# Patient Record
(demographics unavailable — no encounter records)

---

## 2018-10-25 NOTE — EMERGENCY ROOM REPORT
History of Present Illness


General


Chief Complaint:  Alcohol Intoxication





Present Illness


HPI


26 YO Male presents to the ED for acute alcohol intoxication. By standers 

called 911 when they saw pt. altered in a parking lot of [employer] drinking wine. 

he was d/c earlier today after being evaluated for similar presentation. Pt. 

denies trauma or fall. HPI and ROS are limited at this time. pt. only 

verbalizes how much he needs to go the bathroom to urinate.


Allergies:  


Coded Allergies:  


     No Known Allergies (Unverified , 10/25/18)





Patient History


Past Medical History:  see triage record


Past Surgical History:  none


Pertinent Family History:  none


Reviewed Nursing Documentation:  PMH: Agreed; PSxH: Agreed





Review of Systems


All Other Systems:  limited





Physical Exam





Vital Signs








  Date Time  Temp Pulse Resp B/P (MAP) Pulse Ox O2 Delivery O2 Flow Rate FiO2


 


10/25/18 19:13 97.9 18 18 139/78 98 Room Air  








Sp02 EP Interpretation:  reviewed, normal


General Appearance:  no apparent distress, alert - alert and rambling, GCS 15, 

non-toxic


Head:  normocephalic, atraumatic - evidence of old abrasions on forehead and 

left side of face.


Eyes:  bilateral eye normal inspection, bilateral eye PERRL


ENT:  hearing grossly normal, normal voice


Neck:  full range of motion, no bony tend


Respiratory:  chest non-tender, lungs clear, normal breath sounds, speaking 

full sentences, other - no bruises or marks


Cardiovascular #1:  regular rate, rhythm


Gastrointestinal:  normal bowel sounds, non tender, soft, other - no abdominal 

bruising


Musculoskeletal:  back normal, normal range of motion, non-tender, other - pt. 

has staggered gait.


Neurologic:  alert, responsive, motor strength/tone normal, sensory intact, 

other - slurred words, grossly normal


Psychiatric:  judgement/insight normal


Skin:  normal color, no rash, warm/dry, well hydrated, other - abrasions of 

multiple sites, track marks on the bilateral a/c's ,





Medical Decision Making


PA Attestation


 Dr. Doe is my supervising Physician whom patient management has been 

discussed with.


Diagnostic Impression:  


 Primary Impression:  


 Acute alcoholic intoxication


 Qualified Codes:  F10.929 - Alcohol use, unspecified with intoxication, 

unspecified


ER Course


Pt. presents to the ED intoxicated with alcohol, pt. is NAD, pt. is alert, no 

obvious signs of trauma, able to ambulate to chair. 





Ddx considered but are not limited to ETOH, Trauma, Syncope, dementia, OD 





Vital signs: are WNL, pt. is afebrile 





H&PE are most consistent with ETOH intoxication, pt. NAD, non-toxic in 

appearance,clinically inebriated 


ORDERS: CT Head: Unremarkable





ED INTERVENTIONS: Observance while he detoxifies. Pt. was allowed to sleep/rest.





DISCHARGE: At this time pt. is stable for d/c to home. Will provide printed 

patient care instructions, and any necessary prescriptions. Care plan and 

follow up instructions have been discussed with the patient prior to discharge.


CT/MRI/US Diagnostic Results


CT/MRI/US Diagnostic Results :  


   Imaging Test Ordered:  CT Head No Contrast


   Impression


No evidence of acute fracture, hemorrhage, or intracranial process  Per: 

official radiology report





Last Vital Signs








  Date Time  Temp Pulse Resp B/P (MAP) Pulse Ox O2 Delivery O2 Flow Rate FiO2


 


10/25/18 19:13 97.9 18 18 139/78 98 Room Air  








Signed Out To:  Dr. Rey


Referrals:  


NOT CHOSEN [facility]/MD,REFERRING (PCP)











Nehal Beth Oct 25, 2018 21:02

## 2018-10-25 NOTE — EMERGENCY ROOM REPORT
History of Present Illness


General


Chief Complaint:  Alcohol Intoxication


Source:  Patient, EMS





Present Illness


HPI


Patient brought by EMS.  Allegedly he's been drinking alcohol.  He admits to 

this.  He states that he's "fine".  He has some scrapes on his face that it 

happened several days ago. He states he's had seizures in the past but denies 

seizures recently.  Aside from that he refuses to answer questions at this time.


Allergies:  


Coded Allergies:  


     No Known Allergies (Unverified , 10/25/18)





Patient History


Limited by:  medical condition - Acute intoxication


Past Medical History:  see triage record


Social History:  Reports: alcohol use


Reviewed Nursing Documentation:  PMH: Agreed; PSxH: Agreed





Nursing Documentation-PMH


Past Medical History:  No History, Except For


History Of Psychiatric Problem:  No - alcohol abuse





Review of Systems


All Other Systems:  limited





Physical Exam





Vital Signs








  Date Time  Temp Pulse Resp B/P (MAP) Pulse Ox O2 Delivery O2 Flow Rate FiO2


 


10/25/18 07:38 97.5 84 16 143/92 98 Room Air  








General Appearance:  lethargic, other - GCS 14


Head:  normocephalic, other - Facial abrasions


Eyes:  bilateral eye other - Nystagmus


ENT:  moist mucus membranes, other - apthous ulcer lower lip


Neck:  supple, no bony tend


Respiratory:  lungs clear, normal breath sounds


Cardiovascular #1:  normal inspection, regular rate, rhythm


Cardiovascular #2:  2+ radial (R)


Gastrointestinal:  non tender, soft, decreased bowel sounds, scaphoid


Genitourinary:  no CVA tenderness


Neurologic:  responsive, DTRs symmetric, sensory intact, other - And ataxic, 

nystagmus, s


Psychiatric:  other - Lethargic


Skin:  abrasions -





Medical Decision Making


Diagnostic Impression:  


 Primary Impression:  


 Acute alcoholic intoxication


 Qualified Codes:  F10.929 - Alcohol use, unspecified with intoxication, 

unspecified


 Additional Impression:  


 Aphthous ulcer


ER Course


Patient presents with alleged alcohol intoxication.  Differential includes 

alcohol ingestion, other drug ingestion, left right abnormality amongst others.

  The facial trauma is old.  Aside from nystagmus and ataxia the patient has a 

nonfocal neurologic exam.  CT is not indicated at this time.  The patient will 

be evaluated with labs.  The patient be treated with IV hydration and a dose of 

thiamine IV.





Labs with elevated BAL.  Tox + for benzos.  Slightly elevated CK.





After observation and hydration, patient ambulatory.





Denies SI.  States problem with alcohol and had sponsor.  Poor social situation 

at the moment, but knows how to find a place to stay.


Denies pain, NVD, HA, dizziness, back or extremity pain, dysuria, cough, 

dyspnea.





Patient stable for outpatient observation and treatment.





Laboratory Tests








Test


  10/25/18


08:00


 


White Blood Count


  8.8 K/UL


(4.8-10.8)


 


Red Blood Count


  4.96 M/UL


(4.70-6.10)


 


Hemoglobin


  15.5 G/DL


(14.2-18.0)


 


Hematocrit


  45.6 %


(42.0-52.0)


 


Mean Corpuscular Volume 92 FL (80-99)  


 


Mean Corpuscular Hemoglobin


  31.2 PG


(27.0-31.0)  H


 


Mean Corpuscular Hemoglobin


Concent 33.9 G/DL


(32.0-36.0)


 


Red Cell Distribution Width


  12.7 %


(11.6-14.8)


 


Platelet Count


  320 K/UL


(150-450)


 


Mean Platelet Volume


  6.0 FL


(6.5-10.1)  L


 


Neutrophils (%) (Auto)


  56.1 %


(45.0-75.0)


 


Lymphocytes (%) (Auto)


  34.8 %


(20.0-45.0)


 


Monocytes (%) (Auto)


  6.6 %


(1.0-10.0)


 


Eosinophils (%) (Auto)


  1.3 %


(0.0-3.0)


 


Basophils (%) (Auto)


  1.2 %


(0.0-2.0)


 


Urine Color Pale yellow  


 


Urine Appearance Clear  


 


Urine pH 6.5 (4.5-8.0)  


 


Urine Specific Gravity


  1.005


(1.005-1.035)


 


Urine Protein


  Negative


(NEGATIVE)


 


Urine Glucose (UA)


  Negative


(NEGATIVE)


 


Urine Ketones


  Negative


(NEGATIVE)


 


Urine Blood


  Negative


(NEGATIVE)


 


Urine Nitrite


  Negative


(NEGATIVE)


 


Urine Bilirubin


  Negative


(NEGATIVE)


 


Urine Urobilinogen


  Normal MG/DL


(0.0-1.0)


 


Urine Leukocyte Esterase


  Negative


(NEGATIVE)


 


Sodium Level


  145 MMOL/L


(136-145)


 


Potassium Level


  3.7 MMOL/L


(3.5-5.1)


 


Chloride Level


  106 MMOL/L


()


 


Carbon Dioxide Level


  28 MMOL/L


(21-32)


 


Anion Gap


  12 mmol/L


(5-15)


 


Blood Urea Nitrogen


  7 mg/dL (7-18)


 


 


Creatinine


  0.9 MG/DL


(0.55-1.30)


 


Estimate Glomerular


Filtration Rate > 60 mL/min


(>60)


 


Glucose Level


  90 MG/DL


()


 


Calcium Level


  8.3 MG/DL


(8.5-10.1)  L


 


Total Bilirubin


  0.3 MG/DL


(0.2-1.0)


 


Aspartate Amino Transferase


(AST) 69 U/L (15-37)


H


 


Alanine Aminotransferase (ALT)


  40 U/L (12-78)


 


 


Alkaline Phosphatase


  97 U/L


()


 


Total Creatine Kinase


  555 U/L


()  H


 


Total Protein


  7.7 G/DL


(6.4-8.2)


 


Albumin


  3.4 G/DL


(3.4-5.0)


 


Globulin 4.3 g/dL  


 


Albumin/Globulin Ratio


  0.8 (1.0-2.7)


L


 


Salicylates Level


  2.3 ug/mL


(2.8-20)  L


 


Urine Opiates Screen


  Negative


(NEGATIVE)


 


Acetaminophen Level


  < 2 MCG/ML


(10-30)  L


 


Urine Barbiturates Screen


  Negative


(NEGATIVE)


 


Phencyclidine (PCP) Screen


  Negative


(NEGATIVE)


 


Urine Amphetamines Screen


  Negative


(NEGATIVE)


 


Urine Benzodiazepines Screen


  Positive


(NEGATIVE)  H


 


Urine Cocaine Screen


  Negative


(NEGATIVE)


 


Urine Marijuana (THC) Screen


  Negative


(NEGATIVE)


 


Serum Alcohol 456 mg/dL  











Last Vital Signs








  Date Time  Temp Pulse Resp B/P (MAP) Pulse Ox O2 Delivery O2 Flow Rate FiO2


 


10/25/18 15:13 98.4 78 22 130/76 100 Room Air  








Status:  improved


Disposition:  HOME, SELF-CARE


Condition:  Improved











Maximino Núñez MD Oct 25, 2018 07:57

## 2018-10-26 NOTE — DIAGNOSTIC IMAGING REPORT
Indication: Altered mental status

 

Technique: Continuous helical CT scanning of the head was performed without

intravenous contrast material. Axial and coronal 5 mm sections were generated.

Radiation dose was minimized using automated exposure control

 

Dose:

Total Dose Length Product - DLP 2906.13 mGycm.

Volume CT Dose Index - CTDIvol(s) 70.38,70.38 mGy.

 

Comparison: none

 

Findings: The ventricular system is normal in size and configuration. There is no

shift of midline structures. No abnormal extra-axial fluid collections are noted.

There is no evidence of intracerebral bleeding. No other abnormal high or low density

areas are noted within the brain.  There is an air-fluid level in the right maxillary

sinus. The mastoids are clear. The included orbits are unremarkable. The calvarium is

intact. Grey-white differentiation is preserved

 

Impression: Normal CT scan of the head without contrast material.

 

Incidental finding of right maxillary sinus acute sinusitis

 

This agrees with the preliminary interpretation provided overnight by Dr. Rodriguez

 

 

The CT scanner at [facility] is accredited by the [employer]

Radiology and the scans are performed using protocols designed to limit radiation

exposure to as low as reasonably achievable to attain images of sufficient resolution

adequate for diagnostic evaluation.